# Patient Record
Sex: FEMALE | Race: WHITE | HISPANIC OR LATINO | Employment: UNEMPLOYED | URBAN - NONMETROPOLITAN AREA
[De-identification: names, ages, dates, MRNs, and addresses within clinical notes are randomized per-mention and may not be internally consistent; named-entity substitution may affect disease eponyms.]

---

## 2017-03-21 ENCOUNTER — OFFICE VISIT (OUTPATIENT)
Dept: URGENT CARE | Facility: PHYSICIAN GROUP | Age: 62
End: 2017-03-21
Payer: MEDICAID

## 2017-03-21 VITALS
WEIGHT: 145 LBS | TEMPERATURE: 97.9 F | HEART RATE: 76 BPM | OXYGEN SATURATION: 94 % | SYSTOLIC BLOOD PRESSURE: 150 MMHG | RESPIRATION RATE: 16 BRPM | DIASTOLIC BLOOD PRESSURE: 82 MMHG

## 2017-03-21 DIAGNOSIS — R05.9 COUGH: ICD-10-CM

## 2017-03-21 DIAGNOSIS — J06.9 UPPER RESPIRATORY TRACT INFECTION, UNSPECIFIED TYPE: ICD-10-CM

## 2017-03-21 PROCEDURE — 99213 OFFICE O/P EST LOW 20 MIN: CPT | Performed by: PHYSICIAN ASSISTANT

## 2017-03-21 RX ORDER — FLUTICASONE PROPIONATE 50 MCG
2 SPRAY, SUSPENSION (ML) NASAL DAILY
Qty: 1 BOTTLE | Refills: 1 | Status: ON HOLD | OUTPATIENT
Start: 2017-03-21 | End: 2018-02-10

## 2017-03-21 ASSESSMENT — ENCOUNTER SYMPTOMS
SORE THROAT: 1
SHORTNESS OF BREATH: 0
RHINORRHEA: 1
SPUTUM PRODUCTION: 0
COUGH: 1
WHEEZING: 0
CHILLS: 0
FEVER: 0
HEADACHES: 1

## 2017-03-21 NOTE — MR AVS SNAPSHOT
Lucila Hutchison   3/21/2017 9:05 AM   Office Visit   MRN: [id number]    Department:  [facility]   Dept Phone:  [phone]    Description:  Female : 1955   Provider:  MAGNUS Woodall           Reason for Visit     Sinusitis pt states sore throat, headache, sneezing, cough, ear pain x1week       Allergies as of 3/21/2017     No Known Allergies      You were diagnosed with     Upper respiratory tract infection, unspecified type   [0068023]       Cough   [786.2.ICD-9-CM]         Vital Signs     Blood Pressure Pulse Temperature Respirations Weight       150/82 mmHg 76 36.6 °C (97.9 °F) 16 65.772 kg (145 lb)     Oxygen Saturation Smoking Status                94% Never Smoker           Basic Information     Date Of Birth Sex Race Ethnicity Preferred Language    1955 Female Unable to Obtain  Origin (Romanian,Tajik,Stateless,Ugandan, etc) English      Health Maintenance        Date Due Completion Dates    IMM DTaP/Tdap/Td Vaccine (1 - Tdap) 1972 ---    PAP SMEAR 1974 ---    MAMMOGRAM 1993 ---    COLONOSCOPY 2003 ---    IMM ZOSTER VACCINE 2013 ---    IMM INFLUENZA (1) 2016 ---            Current Immunizations     No immunizations on file.      Below and/or attached are the medications your provider expects you to take. Review all of your home medications and newly ordered medications with your provider and/or pharmacist. Follow medication instructions as directed by your provider and/or pharmacist. Please keep your medication list with you and share with your provider. Update the information when medications are discontinued, doses are changed, or new medications (including over-the-counter products) are added; and carry medication information at all times in the event of emergency situations     Allergies:  No Known Allergies          Medications  Valid as of: 2017 -  9:29 AM    Generic Name Brand Name Tablet Size Instructions for use    Ascorbic Acid  (Tab) ascorbic acid 500 MG Take 500 mg by mouth every day.        Cyanocobalamin (Tab) VITAMIN B-12 100 MCG Take 100 mcg by mouth every day.        DM-Doxylamine-Acetaminophen   Take  by mouth.        Ergocalciferol (Cap) DRISDOL 47726 UNITS Take  by mouth every 7 days.        Fexofenadine HCl (Tab) ALLEGRA 180 MG Take 1 Tab by mouth every day.        Fluticasone Propionate (Suspension) FLONASE 50 MCG/ACT Spray 2 Sprays in nose every day.        Magnesium   Take  by mouth.        Multiple Vitamin   Take  by mouth.        Phenol   Spray  in mouth/throat.        Pseudoephedrine HCl   Take  by mouth.        .                 Medicines prescribed today were sent to:     [address] [address]    Phone: [phone] Fax: [phone]    Open 24 Hours?: No      Medication refill instructions:       If your prescription bottle indicates you have medication refills left, it is not necessary to call your provider’s office. Please contact your pharmacy and they will refill your medication.    If your prescription bottle indicates you do not have any refills left, you may request refills at any time through one of the following ways: The online [employer] system (except Urgent Care), by calling your provider’s office, or by asking your pharmacy to contact your provider’s office with a refill request. Medication refills are processed only during regular business hours and may not be available until the next business day. Your provider may request additional information or to have a follow-up visit with you prior to refilling your medication.   *Please Note: Medication refills are assigned a new Rx number when refilled electronically. Your pharmacy may indicate that no refills were authorized even though a new prescription for the same medication is available at the pharmacy. Please request the medicine by name with the pharmacy before contacting your provider  "for a refill.        Instructions        Use Tylenol and/or ibuprofen as needed for pain or fever.  Use a Brad Med, Neti Pot, or other nasal irrigation device daily.  Use Flonase daily.  May try a short course of a decongestant such as Sudafed.  May try Afrin nasal spray for 3-5 days and then discontinue use.  May try an over-the-counter antihistamine such as Zyrtec, Claritin, or Allegra.  Use a cool mist humidifier with distilled water.  Elevate the head of your bed a few inches.  Drink plenty of fluids and get adequate rest.  Follow up with primary care provider in a few days if not improving.  Return for new or worsening symptoms.    Use Tylenol y / o ibuprofeno según sea necesario para el dolor o la fiebre.  Use un Brad Med, Neti, u otro dispositivo de irrigación nasal diaria.  Flonase utilizar todos los días.  Puede probar un curso corto de un descongestionante shaila Sudafed.  Puede tratar Afrin aerosol nasal ramez 3-5 días y luego deje de usarlo.  Puede probar un antihistamínico de venta bony, shaila Zyrtec, Claritin, o Allegra.  Use un humidificador de vapor frío con agua destilada.  Elevar la cabecera de la cama unas cuantas pulgadas.  Beber mucho líquido y descansar lo suficiente.  Seguimiento con el proveedor de atención primaria en pocos días si no se mejora.  Volver a los síntomas nuevos o que empeoran.      Upper Respiratory Infection, Adult  Most upper respiratory infections (URIs) are caused by a virus. A URI affects the nose, throat, and upper air passages. The most common type of URI is often called \"the common cold.\"  HOME CARE   · Take medicines only as told by your doctor.  · Gargle warm saltwater or take cough drops to comfort your throat as told by your doctor.  · Use a warm mist humidifier or inhale steam from a shower to increase air moisture. This may make it easier to breathe.  · Drink enough fluid to keep your pee (urine) clear or pale yellow.  · Eat soups and other clear broths.  · Have a " healthy diet.  · Rest as needed.  · Go back to work when your fever is gone or your doctor says it is okay.  ¨ You may need to stay home longer to avoid giving your URI to others.  ¨ You can also wear a face mask and wash your hands often to prevent spread of the virus.  · Use your inhaler more if you have asthma.  · Do not use any tobacco products, including cigarettes, chewing tobacco, or electronic cigarettes. If you need help quitting, ask your doctor.  GET HELP IF:  · You are getting worse, not better.  · Your symptoms are not helped by medicine.  · You have chills.  · You are getting more short of breath.  · You have brown or red mucus.  · You have yellow or brown discharge from your nose.  · You have pain in your face, especially when you bend forward.  · You have a fever.  · You have puffy (swollen) neck glands.  · You have pain while swallowing.  · You have white areas in the back of your throat.  GET HELP RIGHT AWAY IF:   · You have very bad or constant:  ¨ Headache.  ¨ Ear pain.  ¨ Pain in your forehead, behind your eyes, and over your cheekbones (sinus pain).  ¨ Chest pain.  · You have long-lasting (chronic) lung disease and any of the following:  ¨ Wheezing.  ¨ Long-lasting cough.  ¨ Coughing up blood.  ¨ A change in your usual mucus.  · You have a stiff neck.  · You have changes in your:  ¨ Vision.  ¨ Hearing.  ¨ Thinking.  ¨ Mood.  MAKE SURE YOU:   · Understand these instructions.  · Will watch your condition.  · Will get help right away if you are not doing well or get worse.     This information is not intended to replace advice given to you by your health care provider. Make sure you discuss any questions you have with your health care provider.     Document Released: 06/05/2009 Document Revised: 05/03/2016 Document Reviewed: 03/25/2015  [employer] Interactive Patient Education ©2016 [employer]      Infección del tracto respiratorio superior, adultos  (Upper Respiratory Infection, Adult)  La  mayoría de las infecciones del tracto respiratorio superior están causadas por un virus. Un infección del tracto respiratorio superior afecta la nariz, la garganta y las vías respiratorias superiores. El tipo más común de infección del tracto respiratorio superior es el resfrío común.  CUIDADOS EN EL HOGAR   · [place] los medicamentos solamente shaila se lo haya indicado el médico.  · A fin de aliviar el dolor de garganta, rubén gárgaras con solución salina templada o consuma caramelos para la tos, shaila se lo haya indicado el médico.  · Use un humidificador de vapor cálido o inhale el vapor de la ducha para aumentar la humedad del aire. [place] facilitará la respiración.  · Christy suficiente líquido para mantener el pis (orina) tamia o de color amarillo pálido.  · [place] sopas y caldos transparentes.  · Siga kevin dieta saludable.  · Descanse todo lo que sea necesario.  · Regrese al trabajo cuando la fiebre haya desaparecido o el médico le diga que puede hacerlo.  ¨ Es posible que deba quedarse en reed casa ramez un tiempo prolongado, para no transmitir la infección a los demás.  ¨ También puede usar un barbijo y lavarse las urbano con frecuencia para evitar el contagio del virus.  · Si tiene asma, use el inhalador con mayor frecuencia.  · No consuma ningún producto que contenga tabaco, lo que incluye cigarrillos, tabaco de mascar o cigarrillos electrónicos. Si necesita ayuda para dejar de fumar, consulte al médico.  SOLICITE AYUDA SI:  · Siente que empeora o que no mejora.  · Los medicamentos no logran aliviar los síntomas.  · Tiene escalofríos.  · La dificultad para respirar es peor.  · Tiene mucosidad marrón o vinicio.  · Tiene kevin secreción amarilla o marrón de la nariz.  · Le duele la lor, especialmente al inclinarse hacia adelante.  · Tiene fiebre.  · Tiene los ganglios del bill hinchados.  · Siente dolor al tragar.  · Tiene zonas leslie en la parte de atrás de la garganta.  SOLICITE AYUDA DE INMEDIATO SI:   · Los siguientes  síntomas son muy intensos o constantes:  ¨ Dolor de calin.  ¨ Dolor de oídos.  ¨ Dolor en la frente, detrás de los ojos y por encima de los pómulos (dolor sinusal).  ¨ Dolor en el pecho.  · Tiene enfermedad pulmonar prolongada (crónica) y cualquiera de estos síntomas:  ¨ Sibilancias.  ¨ Tos prolongada.  ¨ Tos con cj.  ¨ Cambio en la mucosidad habitual.  · Presenta rigidez en el bill.  · Tiene cambios en:  ¨ La visión.  ¨ La audición.  ¨ El pensamiento.  ¨ El estado de ánimo.  ASEGÚRESE DE QUE:   · Comprende estas instrucciones.  · Controlará reed afección.  · Recibirá ayuda de inmediato si no mejora o si empeora.     Esta información no tiene shaila fin reemplazar el consejo del médico. Asegúrese de hacerle al médico cualquier pregunta que tenga.     Document Released: 05/21/2012 Document Revised: 05/03/2016  [employer] Interactive Patient Education ©2016 [employer] Inc.      Cough, Adult   A cough is a reflex. It helps you clear your throat and airways. A cough can help heal your body. A cough can last 2 or 3 weeks (acute) or may last more than 8 weeks (chronic). Some common causes of a cough can include an infection, allergy, or a cold.  HOME CARE  · Only take medicine as told by your doctor.  · If given, take your medicines (antibiotics) as told. Finish them even if you start to feel better.  · Use a cold steam vaporizer or humidifier in your home. This can help loosen thick spit (secretions).  · Sleep so you are almost sitting up (semi-upright). Use pillows to do this. This helps reduce coughing.  · Rest as needed.  · Stop smoking if you smoke.  GET HELP RIGHT AWAY IF:  · You have yellowish-white fluid (pus) in your thick spit.  · Your cough gets worse.  · Your medicine does not reduce coughing, and you are losing sleep.  · You cough up blood.  · You have trouble breathing.  · Your pain gets worse and medicine does not help.  · You have a fever.  MAKE SURE YOU:   · Understand these instructions.  · Will watch  your condition.  · Will get help right away if you are not doing well or get worse.     This information is not intended to replace advice given to you by your health care provider. Make sure you discuss any questions you have with your health care provider.     Document Released: 08/30/2012 Document Revised: 01/08/2016 Document Reviewed: 02/24/2016  [employer] Interactive Patient Education ©2016 Elsevier Inc.      Tos - Adultos   (Cough, Adult)   La tos es un reflejo. Ayuda a limpiar la garganta y las vías aéreas. Ayuda a que el organismo se cure. La tos puede durar entre 2 ó 3 semanas (aguda) o puede durar más de 8 semanas (crónica) Las causas más frecuentes son las infecciones, las alergias o los resfríos.   CUIDADOS EN EL HOGAR   · Sólo tome la medicación según las indicaciones.  · Si le indican medicamentos (antibióticos), tómelos toby shaila se le indicó. Tómelos todos, aunque se sienta mejor.  · Coloque un vaporizador o humidificador de angie fría en reed casa. [place] puede ayudar a aflojar el moco espeso (secreciones).  · Duerma de modo que quede beba sentado (semi erguido). Use almohadas para lograr esta posición. [place] ayuda a disminuir la tos.  · Descanse todo lo que pueda.  · Si fuma, abandone el hábito.  SOLICITE AYUDA DE INMEDIATO SI:   · Observa kevin secreción de color connolly amarillento (pus) en el moco.  · La tos empeora.  · El medicamento no disminuye la tos y no puede dormir.  · Tose y escupe cj.  · Tiene dificultad para respirar.  · Siente un dolor más intenso y los medicamentos no hacen efecto.  · Tiene fiebre.  ASEGÚRESE DE QUE:   · Comprende estas instrucciones.  · Controlará reed enfermedad.  · Solicitará ayuda de inmediato si no mejora o si empeora.     Esta información no tiene shaila fin reemplazar el consejo del médico. Asegúrese de hacerle al médico cualquier pregunta que tenga.     Document Released: 08/30/2012 Document Revised: 03/11/2013  Elsevier Interactive Patient Education ©2016 Elsevier  Inc.            [employer] Access Code: [email or web address]  Expires: 4/20/2017  9:29 AM    Your email address is not on file at [facility].  Email Addresses are required for you to sign up for [employer], please contact [phone] to verify your personal information and to provide your email address prior to attempting to register for [employer].    Lucila Hutchison  [address] [address]    [employer]  A secure, online tool to manage your health information     [facility]’s [employer]® is a secure, online tool that connects you to your personalized health information from the privacy of your home -- day or night - making it very easy for you to manage your healthcare. Once the activation process is completed, you can even access your medical information using the [employer] bossman, which is available for free in the Apple Bossman store or Google Play store.     To learn more about [employer], visit [email or web address]    There are two levels of access available (as shown below):   My Chart Features  [facility] Primary Care Doctor [facility]  Specialists [facility]  Urgent  Care Non-[facility] Primary Care Doctor   Email your healthcare team securely and privately 24/7 X X X    Manage appointments: schedule your next appointment; view details of past/upcoming appointments X      Request prescription refills. X      View recent personal medical records, including lab and immunizations X X X X   View health record, including health history, allergies, medications X X X X   Read reports about your outpatient visits, procedures, consult and ER notes X X X X   See your discharge summary, which is a recap of your hospital and/or ER visit that includes your diagnosis, lab results, and care plan X X  X     How to register for [employer]:  Once your e-mail address has been verified, follow the following steps to sign up for [employer].     1. Go to  [email or web address]  2. Click on the Sign Up Now box, which takes you to the New  Member Sign Up page. You will need to provide the following information:  a. Enter your [employer] Access Code exactly as it appears at the top of this page. (You will not need to use this code after you’ve completed the sign-up process. If you do not sign up before the expiration date, you must request a new code.)   b. Enter your date of birth.   c. Enter your home email address.   d. Click Submit, and follow the next screen’s instructions.  3. Create a [employer] ID. This will be your [employer] login ID and cannot be changed, so think of one that is secure and easy to remember.  4. Create a [employer] password. You can change your password at any time.  5. Enter your Password Reset Question and Answer. This can be used at a later time if you forget your password.   6. Enter your e-mail address. This allows you to receive e-mail notifications when new information is available in [employer].  7. Click Sign Up. You can now view your health information.    For assistance activating your [employer] account, call [phone]

## 2017-03-21 NOTE — PATIENT INSTRUCTIONS
"    Use Tylenol and/or ibuprofen as needed for pain or fever.  Use a Brad Med, Neti Pot, or other nasal irrigation device daily.  Use Flonase daily.  May try a short course of a decongestant such as Sudafed.  May try Afrin nasal spray for 3-5 days and then discontinue use.  May try an over-the-counter antihistamine such as Zyrtec, Claritin, or Allegra.  Use a cool mist humidifier with distilled water.  Elevate the head of your bed a few inches.  Drink plenty of fluids and get adequate rest.  Follow up with primary care provider in a few days if not improving.  Return for new or worsening symptoms.    Use Tylenol y / o ibuprofeno según sea necesario para el dolor o la fiebre.  Use un Brad Med, Neti, u otro dispositivo de irrigación nasal diaria.  Flonase utilizar todos los días.  Puede probar un curso corto de un descongestionante shaila Sudafed.  Puede tratar Afrin aerosol nasal ramez 3-5 días y luego deje de usarlo.  Puede probar un antihistamínico de venta bony, shaila Zyrtec, Claritin, o Allegra.  Use un humidificador de vapor frío con agua destilada.  Elevar la cabecera de la cama unas cuantas pulgadas.  Beber mucho líquido y descansar lo suficiente.  Seguimiento con el proveedor de atención primaria en pocos días si no se mejora.  Volver a los síntomas nuevos o que empeoran.      Upper Respiratory Infection, Adult  Most upper respiratory infections (URIs) are caused by a virus. A URI affects the nose, throat, and upper air passages. The most common type of URI is often called \"the common cold.\"  HOME CARE   · Take medicines only as told by your doctor.  · Gargle warm saltwater or take cough drops to comfort your throat as told by your doctor.  · Use a warm mist humidifier or inhale steam from a shower to increase air moisture. This may make it easier to breathe.  · Drink enough fluid to keep your pee (urine) clear or pale yellow.  · Eat soups and other clear broths.  · Have a healthy diet.  · Rest as " needed.  · Go back to work when your fever is gone or your doctor says it is okay.  ¨ You may need to stay home longer to avoid giving your URI to others.  ¨ You can also wear a face mask and wash your hands often to prevent spread of the virus.  · Use your inhaler more if you have asthma.  · Do not use any tobacco products, including cigarettes, chewing tobacco, or electronic cigarettes. If you need help quitting, ask your doctor.  GET HELP IF:  · You are getting worse, not better.  · Your symptoms are not helped by medicine.  · You have chills.  · You are getting more short of breath.  · You have brown or red mucus.  · You have yellow or brown discharge from your nose.  · You have pain in your face, especially when you bend forward.  · You have a fever.  · You have puffy (swollen) neck glands.  · You have pain while swallowing.  · You have white areas in the back of your throat.  GET HELP RIGHT AWAY IF:   · You have very bad or constant:  ¨ Headache.  ¨ Ear pain.  ¨ Pain in your forehead, behind your eyes, and over your cheekbones (sinus pain).  ¨ Chest pain.  · You have long-lasting (chronic) lung disease and any of the following:  ¨ Wheezing.  ¨ Long-lasting cough.  ¨ Coughing up blood.  ¨ A change in your usual mucus.  · You have a stiff neck.  · You have changes in your:  ¨ Vision.  ¨ Hearing.  ¨ Thinking.  ¨ Mood.  MAKE SURE YOU:   · Understand these instructions.  · Will watch your condition.  · Will get help right away if you are not doing well or get worse.     This information is not intended to replace advice given to you by your health care provider. Make sure you discuss any questions you have with your health care provider.     Document Released: 06/05/2009 Document Revised: 05/03/2016 Document Reviewed: 03/25/2015  [employer] Interactive Patient Education ©2016 [employer]      Infección del tracto respiratorio superior, adultos  (Upper Respiratory Infection, Adult)  La mayoría de las infecciones del  tracto respiratorio superior están causadas por un virus. Un infección del tracto respiratorio superior afecta la nariz, la garganta y las vías respiratorias superiores. El tipo más común de infección del tracto respiratorio superior es el resfrío común.  CUIDADOS EN EL HOGAR   · [place] los medicamentos solamente shaila se lo haya indicado el médico.  · A fin de aliviar el dolor de garganta, rubén gárgaras con solución salina templada o consuma caramelos para la tos, shaila se lo haya indicado el médico.  · Use un humidificador de vapor cálido o inhale el vapor de la ducha para aumentar la humedad del aire. [place] facilitará la respiración.  · Christy suficiente líquido para mantener el pis (orina) tamia o de color amarillo pálido.  · [place] sopas y caldos transparentes.  · Siga kevin dieta saludable.  · Descanse todo lo que sea necesario.  · Regrese al trabajo cuando la fiebre haya desaparecido o el médico le diga que puede hacerlo.  ¨ Es posible que deba quedarse en reed casa ramez un tiempo prolongado, para no transmitir la infección a los demás.  ¨ También puede usar un barbijo y lavarse las urbano con frecuencia para evitar el contagio del virus.  · Si tiene asma, use el inhalador con mayor frecuencia.  · No consuma ningún producto que contenga tabaco, lo que incluye cigarrillos, tabaco de mascar o cigarrillos electrónicos. Si necesita ayuda para dejar de fumar, consulte al médico.  SOLICITE AYUDA SI:  · Siente que empeora o que no mejora.  · Los medicamentos no logran aliviar los síntomas.  · Tiene escalofríos.  · La dificultad para respirar es peor.  · Tiene mucosidad marrón o vinicio.  · Tiene kevin secreción amarilla o marrón de la nariz.  · Le duele la lor, especialmente al inclinarse hacia adelante.  · Tiene fiebre.  · Tiene los ganglios del bill hinchados.  · Siente dolor al tragar.  · Tiene zonas leslie en la parte de atrás de la garganta.  SOLICITE AYUDA DE INMEDIATO SI:   · Los siguientes síntomas son muy intensos o  constantes:  ¨ Dolor de calin.  ¨ Dolor de oídos.  ¨ Dolor en la frente, detrás de los ojos y por encima de los pómulos (dolor sinusal).  ¨ Dolor en el pecho.  · Tiene enfermedad pulmonar prolongada (crónica) y cualquiera de estos síntomas:  ¨ Sibilancias.  ¨ Tos prolongada.  ¨ Tos con cj.  ¨ Cambio en la mucosidad habitual.  · Presenta rigidez en el bill.  · Tiene cambios en:  ¨ La visión.  ¨ La audición.  ¨ El pensamiento.  ¨ El estado de ánimo.  ASEGÚRESE DE QUE:   · Comprende estas instrucciones.  · Controlará reed afección.  · Recibirá ayuda de inmediato si no mejora o si empeora.     Esta información no tiene shaila fin reemplazar el consejo del médico. Asegúrese de hacerle al médico cualquier pregunta que tenga.     Document Released: 05/21/2012 Document Revised: 05/03/2016  [employer] Interactive Patient Education ©2016 [employer]      Cough, Adult   A cough is a reflex. It helps you clear your throat and airways. A cough can help heal your body. A cough can last 2 or 3 weeks (acute) or may last more than 8 weeks (chronic). Some common causes of a cough can include an infection, allergy, or a cold.  HOME CARE  · Only take medicine as told by your doctor.  · If given, take your medicines (antibiotics) as told. Finish them even if you start to feel better.  · Use a cold steam vaporizer or humidifier in your home. This can help loosen thick spit (secretions).  · Sleep so you are almost sitting up (semi-upright). Use pillows to do this. This helps reduce coughing.  · Rest as needed.  · Stop smoking if you smoke.  GET HELP RIGHT AWAY IF:  · You have yellowish-white fluid (pus) in your thick spit.  · Your cough gets worse.  · Your medicine does not reduce coughing, and you are losing sleep.  · You cough up blood.  · You have trouble breathing.  · Your pain gets worse and medicine does not help.  · You have a fever.  MAKE SURE YOU:   · Understand these instructions.  · Will watch your condition.  · Will get  help right away if you are not doing well or get worse.     This information is not intended to replace advice given to you by your health care provider. Make sure you discuss any questions you have with your health care provider.     Document Released: 08/30/2012 Document Revised: 01/08/2016 Document Reviewed: 02/24/2016  [employer] Interactive Patient Education ©2016 [employer]      Tos - Adultos   (Cough, Adult)   La tos es un reflejo. Ayuda a limpiar la garganta y las vías aéreas. Ayuda a que el organismo se cure. La tos puede durar entre 2 ó 3 semanas (aguda) o puede durar más de 8 semanas (crónica) Las causas más frecuentes son las infecciones, las alergias o los resfríos.   CUIDADOS EN EL HOGAR   · Sólo tome la medicación según las indicaciones.  · Si le indican medicamentos (antibióticos), tómelos toby shaila se le indicó. Tómelos todos, aunque se sienta mejor.  · Coloque un vaporizador o humidificador de angie fría en reed casa. [place] puede ayudar a aflojar el moco espeso (secreciones).  · Duerma de modo que quede beba sentado (semi erguido). Use almohadas para lograr esta posición. [place] ayuda a disminuir la tos.  · Descanse todo lo que pueda.  · Si fuma, abandone el hábito.  SOLICITE AYUDA DE INMEDIATO SI:   · Observa kevin secreción de color connolly amarillento (pus) en el moco.  · La tos empeora.  · El medicamento no disminuye la tos y no puede dormir.  · Tose y escupe cj.  · Tiene dificultad para respirar.  · Siente un dolor más intenso y los medicamentos no hacen efecto.  · Tiene fiebre.  ASEGÚRESE DE QUE:   · Comprende estas instrucciones.  · Controlará reed enfermedad.  · Solicitará ayuda de inmediato si no mejora o si empeora.     Esta información no tiene shaila fin reemplazar el consejo del médico. Asegúrese de hacerle al médico cualquier pregunta que tenga.     Document Released: 08/30/2012 Document Revised: 03/11/2013  [employer] Interactive Patient Education ©2016 [employer].

## 2017-03-21 NOTE — PROGRESS NOTES
Subjective:      Lucila Hutchison is a 61 y.o. female who presents with Sinusitis            HPI Comments: Almost 1 week history of rhinorrhea, congestion, sneezing, cough, headache, and generalized not feeling well. No shortness of breath or wheezing. She reports fever initially, but no fever for the last several days.    URI   This is a new problem. The current episode started in the past 7 days. The problem has been waxing and waning. There has been no fever. Associated symptoms include congestion, coughing, headaches, rhinorrhea and a sore throat. Pertinent negatives include no ear pain or wheezing. Treatments tried: OTC multi symptom cold relief. The treatment provided mild relief.       Review of Systems   Constitutional: Negative for fever and chills.   HENT: Positive for congestion, rhinorrhea and sore throat. Negative for ear discharge and ear pain.    Respiratory: Positive for cough. Negative for sputum production, shortness of breath and wheezing.    Neurological: Positive for headaches.     Allergies:Review of patient's allergies indicates no known allergies.    Current Outpatient Prescriptions Ordered in [facility]   Medication Sig Dispense Refill   • DM-Doxylamine-Acetaminophen (NYQUIL COLD & FLU PO) Take  by mouth.     • Pseudoephedrine HCl (SUDAFED PO) Take  by mouth.     • Phenol (CHLORASEPTIC MOUTH PAIN MT) [place]  in mouth/throat.     • fluticasone (FLONASE) 50 MCG/ACT nasal spray Spray 2 Sprays in nose every day. 1 Bottle 1   • ascorbic acid (ASCORBIC ACID) 500 MG Tab Take 500 mg by mouth every day.     • cyanocobalamin (VITAMIN B-12) 100 MCG Tab Take 100 mcg by mouth every day.     • MAGNESIUM PO Take  by mouth.     • Multiple Vitamin (MULTI-VITAMIN DAILY PO) Take  by mouth.     • vitamin D, Ergocalciferol, (DRISDOL) 21126 UNITS Cap capsule Take  by mouth every 7 days.     • fexofenadine (ALLEGRA) 180 MG tablet Take 1 Tab by mouth every day. 30 Tab 0     No current Epic-ordered facility-administered  medications on file.       History reviewed. No pertinent past medical history.    Social History   Substance Use Topics   • Smoking status: Never Smoker    • Smokeless tobacco: Never Used   • Alcohol Use: No       No family status information on file.   History reviewed. No pertinent family history.       Objective:     /82 mmHg  Pulse 76  Temp(Src) 36.6 °C (97.9 °F)  Resp 16  Ht   Wt 65.772 kg (145 lb)  SpO2 94%     Physical Exam   Constitutional: She is oriented to person, place, and time. She appears well-developed and well-nourished. No distress.   HENT:   Head: Normocephalic and atraumatic.   Right Ear: External ear normal.   Left Ear: External ear normal.   Mouth/Throat: Oropharynx is clear and moist.   Canals unremarkable. Tympanic membranes dull without erythema. Very mild nasal mucosal edema. No sinus tenderness   Eyes: Right eye exhibits no discharge. Left eye exhibits no discharge.   Neck: Normal range of motion. Neck supple.   Cardiovascular: Normal rate and regular rhythm.    Pulmonary/Chest: Effort normal and breath sounds normal. She has no wheezes. She has no rales.   Neurological: She is alert and oriented to person, place, and time.   Skin: Skin is warm and dry. She is not diaphoretic.   Psychiatric: She has a normal mood and affect. Her behavior is normal. Judgment and thought content normal.   Nursing note and vitals reviewed.              Assessment/Plan:     1. Upper respiratory tract infection, unspecified type  fluticasone (FLONASE) 50 MCG/ACT nasal spray    Fluctuating for about a week. No obvious bacterial etiology. Likely viral and/or allergic. Given written instructions. Follow-up with PCP   2. Cough      Fluctuating for couple of days. Likely secondary to postnasal drainage. Continue OTC cough suppressant       [employer] Interactive Patient Education given: URI, cough    Use Tylenol and/or ibuprofen as needed for pain or fever.  Use a Brad Med, Neti Pot, or other nasal  irrigation device daily.  Use Flonase daily.  May try a short course of a decongestant such as Sudafed.  May try Afrin nasal spray for 3-5 days and then discontinue use.  May try an over-the-counter antihistamine such as Zyrtec, Claritin, or Allegra.  Use a cool mist humidifier with distilled water.  Elevate the head of your bed a few inches.  Drink plenty of fluids and get adequate rest.  Follow up with primary care provider in a few days if not improving.  Return for new or worsening symptoms.      Please note that this dictation was created using voice recognition software. I have made every reasonable attempt to correct obvious errors, but I expect that there are errors of grammar and possibly content that I did not discover before finalizing the note.

## 2018-02-09 ENCOUNTER — HOSPITAL ENCOUNTER (OUTPATIENT)
Facility: MEDICAL CENTER | Age: 63
End: 2018-02-09

## 2018-02-09 ENCOUNTER — RESOLUTE PROFESSIONAL BILLING HOSPITAL PROF FEE (OUTPATIENT)
Dept: HOSPITALIST | Facility: MEDICAL CENTER | Age: 63
End: 2018-02-09
Payer: COMMERCIAL

## 2018-02-09 ENCOUNTER — HOSPITAL ENCOUNTER (OUTPATIENT)
Facility: MEDICAL CENTER | Age: 63
End: 2018-02-10
Attending: HOSPITALIST | Admitting: HOSPITALIST
Payer: COMMERCIAL

## 2018-02-09 PROCEDURE — 99220 PR INITIAL OBSERVATION CARE,LEVL III: CPT | Performed by: HOSPITALIST

## 2018-02-09 PROCEDURE — G0378 HOSPITAL OBSERVATION PER HR: HCPCS

## 2018-02-09 PROCEDURE — 700105 HCHG RX REV CODE 258: Performed by: HOSPITALIST

## 2018-02-09 PROCEDURE — 700102 HCHG RX REV CODE 250 W/ 637 OVERRIDE(OP): Performed by: HOSPITALIST

## 2018-02-09 PROCEDURE — A9270 NON-COVERED ITEM OR SERVICE: HCPCS | Performed by: HOSPITALIST

## 2018-02-09 RX ORDER — ACETAMINOPHEN 325 MG/1
650 TABLET ORAL EVERY 6 HOURS PRN
Status: DISCONTINUED | OUTPATIENT
Start: 2018-02-09 | End: 2018-02-10 | Stop reason: HOSPADM

## 2018-02-09 RX ORDER — CLONIDINE HYDROCHLORIDE 0.1 MG/1
0.1 TABLET ORAL EVERY 6 HOURS PRN
Status: DISCONTINUED | OUTPATIENT
Start: 2018-02-09 | End: 2018-02-10 | Stop reason: HOSPADM

## 2018-02-09 RX ORDER — ASPIRIN 81 MG/1
324 TABLET, CHEWABLE ORAL DAILY
Status: DISCONTINUED | OUTPATIENT
Start: 2018-02-10 | End: 2018-02-10 | Stop reason: HOSPADM

## 2018-02-09 RX ORDER — OXYCODONE HYDROCHLORIDE 5 MG/1
5 TABLET ORAL
Status: DISCONTINUED | OUTPATIENT
Start: 2018-02-09 | End: 2018-02-10 | Stop reason: HOSPADM

## 2018-02-09 RX ORDER — ZOLPIDEM TARTRATE 5 MG/1
5 TABLET ORAL
Status: DISCONTINUED | OUTPATIENT
Start: 2018-02-09 | End: 2018-02-10 | Stop reason: HOSPADM

## 2018-02-09 RX ORDER — MORPHINE SULFATE 4 MG/ML
2 INJECTION, SOLUTION INTRAMUSCULAR; INTRAVENOUS
Status: DISCONTINUED | OUTPATIENT
Start: 2018-02-09 | End: 2018-02-10 | Stop reason: HOSPADM

## 2018-02-09 RX ORDER — ENALAPRILAT 1.25 MG/ML
1.25 INJECTION INTRAVENOUS EVERY 6 HOURS PRN
Status: DISCONTINUED | OUTPATIENT
Start: 2018-02-09 | End: 2018-02-10 | Stop reason: HOSPADM

## 2018-02-09 RX ORDER — ASPIRIN 325 MG
325 TABLET ORAL DAILY
Status: DISCONTINUED | OUTPATIENT
Start: 2018-02-10 | End: 2018-02-10 | Stop reason: HOSPADM

## 2018-02-09 RX ORDER — SODIUM CHLORIDE 9 MG/ML
INJECTION, SOLUTION INTRAVENOUS CONTINUOUS
Status: DISCONTINUED | OUTPATIENT
Start: 2018-02-09 | End: 2018-02-10 | Stop reason: HOSPADM

## 2018-02-09 RX ORDER — PROMETHAZINE HYDROCHLORIDE 25 MG/1
12.5-25 SUPPOSITORY RECTAL EVERY 4 HOURS PRN
Status: DISCONTINUED | OUTPATIENT
Start: 2018-02-09 | End: 2018-02-10 | Stop reason: HOSPADM

## 2018-02-09 RX ORDER — ONDANSETRON 4 MG/1
4 TABLET, ORALLY DISINTEGRATING ORAL EVERY 4 HOURS PRN
Status: DISCONTINUED | OUTPATIENT
Start: 2018-02-09 | End: 2018-02-10 | Stop reason: HOSPADM

## 2018-02-09 RX ORDER — ASPIRIN 300 MG/1
300 SUPPOSITORY RECTAL DAILY
Status: DISCONTINUED | OUTPATIENT
Start: 2018-02-10 | End: 2018-02-10 | Stop reason: HOSPADM

## 2018-02-09 RX ORDER — BISACODYL 10 MG
10 SUPPOSITORY, RECTAL RECTAL
Status: DISCONTINUED | OUTPATIENT
Start: 2018-02-09 | End: 2018-02-10 | Stop reason: HOSPADM

## 2018-02-09 RX ORDER — POLYETHYLENE GLYCOL 3350 17 G/17G
1 POWDER, FOR SOLUTION ORAL
Status: DISCONTINUED | OUTPATIENT
Start: 2018-02-09 | End: 2018-02-10 | Stop reason: HOSPADM

## 2018-02-09 RX ORDER — PROMETHAZINE HYDROCHLORIDE 25 MG/1
12.5-25 TABLET ORAL EVERY 4 HOURS PRN
Status: DISCONTINUED | OUTPATIENT
Start: 2018-02-09 | End: 2018-02-10 | Stop reason: HOSPADM

## 2018-02-09 RX ORDER — AMOXICILLIN 250 MG
2 CAPSULE ORAL 2 TIMES DAILY
Status: DISCONTINUED | OUTPATIENT
Start: 2018-02-09 | End: 2018-02-10 | Stop reason: HOSPADM

## 2018-02-09 RX ORDER — OXYCODONE HYDROCHLORIDE 5 MG/1
2.5 TABLET ORAL
Status: DISCONTINUED | OUTPATIENT
Start: 2018-02-09 | End: 2018-02-10 | Stop reason: HOSPADM

## 2018-02-09 RX ORDER — ONDANSETRON 2 MG/ML
4 INJECTION INTRAMUSCULAR; INTRAVENOUS EVERY 4 HOURS PRN
Status: DISCONTINUED | OUTPATIENT
Start: 2018-02-09 | End: 2018-02-10 | Stop reason: HOSPADM

## 2018-02-09 RX ADMIN — OXYCODONE HYDROCHLORIDE 5 MG: 5 TABLET ORAL at 22:05

## 2018-02-09 RX ADMIN — SODIUM CHLORIDE: 9 INJECTION, SOLUTION INTRAVENOUS at 23:44

## 2018-02-09 RX ADMIN — STANDARDIZED SENNA CONCENTRATE AND DOCUSATE SODIUM 2 TABLET: 8.6; 5 TABLET, FILM COATED ORAL at 22:07

## 2018-02-09 ASSESSMENT — PATIENT HEALTH QUESTIONNAIRE - PHQ9
SUM OF ALL RESPONSES TO PHQ QUESTIONS 1-9: 0
SUM OF ALL RESPONSES TO PHQ9 QUESTIONS 1 AND 2: 0
1. LITTLE INTEREST OR PLEASURE IN DOING THINGS: NOT AT ALL
2. FEELING DOWN, DEPRESSED, IRRITABLE, OR HOPELESS: NOT AT ALL

## 2018-02-09 ASSESSMENT — PAIN SCALES - GENERAL
PAINLEVEL_OUTOF10: 10
PAINLEVEL_OUTOF10: 10

## 2018-02-09 ASSESSMENT — LIFESTYLE VARIABLES
EVER_SMOKED: NEVER
ALCOHOL_USE: NO

## 2018-02-10 ENCOUNTER — APPOINTMENT (OUTPATIENT)
Dept: RADIOLOGY | Facility: MEDICAL CENTER | Age: 63
End: 2018-02-10
Attending: HOSPITALIST
Payer: COMMERCIAL

## 2018-02-10 ENCOUNTER — PATIENT OUTREACH (OUTPATIENT)
Dept: HEALTH INFORMATION MANAGEMENT | Facility: OTHER | Age: 63
End: 2018-02-10

## 2018-02-10 ENCOUNTER — APPOINTMENT (OUTPATIENT)
Dept: RADIOLOGY | Facility: MEDICAL CENTER | Age: 63
End: 2018-02-10
Attending: INTERNAL MEDICINE
Payer: COMMERCIAL

## 2018-02-10 VITALS
TEMPERATURE: 98.3 F | DIASTOLIC BLOOD PRESSURE: 86 MMHG | WEIGHT: 154.98 LBS | SYSTOLIC BLOOD PRESSURE: 156 MMHG | HEIGHT: 62 IN | BODY MASS INDEX: 28.52 KG/M2 | HEART RATE: 90 BPM | OXYGEN SATURATION: 95 % | RESPIRATION RATE: 17 BRPM

## 2018-02-10 PROBLEM — M19.90 OSTEOARTHRITIS: Status: ACTIVE | Noted: 2018-02-10

## 2018-02-10 PROBLEM — I10 ESSENTIAL HYPERTENSION: Status: ACTIVE | Noted: 2018-02-10

## 2018-02-10 PROBLEM — A80.9 POLIO: Status: ACTIVE | Noted: 2018-02-10

## 2018-02-10 PROBLEM — R07.9 CHEST PAIN: Status: ACTIVE | Noted: 2018-02-10

## 2018-02-10 LAB
ANION GAP SERPL CALC-SCNC: 8 MMOL/L (ref 0–11.9)
BASOPHILS # BLD AUTO: 0.6 % (ref 0–1.8)
BASOPHILS # BLD: 0.04 K/UL (ref 0–0.12)
BUN SERPL-MCNC: 10 MG/DL (ref 8–22)
CALCIUM SERPL-MCNC: 9.2 MG/DL (ref 8.5–10.5)
CHLORIDE SERPL-SCNC: 109 MMOL/L (ref 96–112)
CO2 SERPL-SCNC: 22 MMOL/L (ref 20–33)
CREAT SERPL-MCNC: 0.36 MG/DL (ref 0.5–1.4)
EOSINOPHIL # BLD AUTO: 0.13 K/UL (ref 0–0.51)
EOSINOPHIL NFR BLD: 1.8 % (ref 0–6.9)
ERYTHROCYTE [DISTWIDTH] IN BLOOD BY AUTOMATED COUNT: 47.2 FL (ref 35.9–50)
EST. AVERAGE GLUCOSE BLD GHB EST-MCNC: 120 MG/DL
GLUCOSE SERPL-MCNC: 93 MG/DL (ref 65–99)
HBA1C MFR BLD: 5.8 % (ref 0–5.6)
HCT VFR BLD AUTO: 39.5 % (ref 37–47)
HGB BLD-MCNC: 13.2 G/DL (ref 12–16)
IMM GRANULOCYTES # BLD AUTO: 0.02 K/UL (ref 0–0.11)
IMM GRANULOCYTES NFR BLD AUTO: 0.3 % (ref 0–0.9)
LYMPHOCYTES # BLD AUTO: 2.67 K/UL (ref 1–4.8)
LYMPHOCYTES NFR BLD: 38 % (ref 22–41)
MCH RBC QN AUTO: 32 PG (ref 27–33)
MCHC RBC AUTO-ENTMCNC: 33.4 G/DL (ref 33.6–35)
MCV RBC AUTO: 95.9 FL (ref 81.4–97.8)
MONOCYTES # BLD AUTO: 0.57 K/UL (ref 0–0.85)
MONOCYTES NFR BLD AUTO: 8.1 % (ref 0–13.4)
NEUTROPHILS # BLD AUTO: 3.6 K/UL (ref 2–7.15)
NEUTROPHILS NFR BLD: 51.2 % (ref 44–72)
NRBC # BLD AUTO: 0 K/UL
NRBC BLD-RTO: 0 /100 WBC
PLATELET # BLD AUTO: 200 K/UL (ref 164–446)
PMV BLD AUTO: 10.7 FL (ref 9–12.9)
POTASSIUM SERPL-SCNC: 3.6 MMOL/L (ref 3.6–5.5)
RBC # BLD AUTO: 4.12 M/UL (ref 4.2–5.4)
SODIUM SERPL-SCNC: 139 MMOL/L (ref 135–145)
TROPONIN I SERPL-MCNC: <0.01 NG/ML (ref 0–0.04)
TROPONIN I SERPL-MCNC: <0.01 NG/ML (ref 0–0.04)
WBC # BLD AUTO: 7 K/UL (ref 4.8–10.8)

## 2018-02-10 PROCEDURE — 85025 COMPLETE CBC W/AUTO DIFF WBC: CPT

## 2018-02-10 PROCEDURE — 700111 HCHG RX REV CODE 636 W/ 250 OVERRIDE (IP)

## 2018-02-10 PROCEDURE — 84484 ASSAY OF TROPONIN QUANT: CPT | Mod: 91

## 2018-02-10 PROCEDURE — 700111 HCHG RX REV CODE 636 W/ 250 OVERRIDE (IP): Performed by: HOSPITALIST

## 2018-02-10 PROCEDURE — 94760 N-INVAS EAR/PLS OXIMETRY 1: CPT

## 2018-02-10 PROCEDURE — G0378 HOSPITAL OBSERVATION PER HR: HCPCS

## 2018-02-10 PROCEDURE — A9502 TC99M TETROFOSMIN: HCPCS

## 2018-02-10 PROCEDURE — A9270 NON-COVERED ITEM OR SERVICE: HCPCS | Performed by: HOSPITALIST

## 2018-02-10 PROCEDURE — 83036 HEMOGLOBIN GLYCOSYLATED A1C: CPT

## 2018-02-10 PROCEDURE — 36415 COLL VENOUS BLD VENIPUNCTURE: CPT

## 2018-02-10 PROCEDURE — 80048 BASIC METABOLIC PNL TOTAL CA: CPT

## 2018-02-10 PROCEDURE — 99217 PR OBSERVATION CARE DISCHARGE: CPT | Performed by: INTERNAL MEDICINE

## 2018-02-10 PROCEDURE — 700102 HCHG RX REV CODE 250 W/ 637 OVERRIDE(OP): Performed by: HOSPITALIST

## 2018-02-10 RX ORDER — REGADENOSON 0.08 MG/ML
INJECTION, SOLUTION INTRAVENOUS
Status: COMPLETED
Start: 2018-02-10 | End: 2018-02-10

## 2018-02-10 RX ADMIN — ONDANSETRON 4 MG: 4 TABLET, ORALLY DISINTEGRATING ORAL at 17:10

## 2018-02-10 RX ADMIN — ASPIRIN 325 MG: 325 TABLET ORAL at 15:38

## 2018-02-10 RX ADMIN — REGADENOSON 0.4 MG: 0.08 INJECTION, SOLUTION INTRAVENOUS at 14:07

## 2018-02-10 RX ADMIN — ENOXAPARIN SODIUM 40 MG: 100 INJECTION SUBCUTANEOUS at 15:39

## 2018-02-10 RX ADMIN — MORPHINE SULFATE 2 MG: 4 INJECTION INTRAVENOUS at 08:23

## 2018-02-10 RX ADMIN — OXYCODONE HYDROCHLORIDE 5 MG: 5 TABLET ORAL at 15:38

## 2018-02-10 ASSESSMENT — COPD QUESTIONNAIRES
DO YOU EVER COUGH UP ANY MUCUS OR PHLEGM?: NO/ONLY WITH OCCASIONAL COLDS OR INFECTIONS
HAVE YOU SMOKED AT LEAST 100 CIGARETTES IN YOUR ENTIRE LIFE: NO/DON'T KNOW
DURING THE PAST 4 WEEKS HOW MUCH DID YOU FEEL SHORT OF BREATH: NONE/LITTLE OF THE TIME
COPD SCREENING SCORE: 2

## 2018-02-10 ASSESSMENT — ENCOUNTER SYMPTOMS
SPUTUM PRODUCTION: 0
HEADACHES: 0
BLURRED VISION: 0
NAUSEA: 0
CHILLS: 0
PALPITATIONS: 0
FEVER: 0
SHORTNESS OF BREATH: 0
CONSTIPATION: 0
DOUBLE VISION: 0
DIARRHEA: 0
ABDOMINAL PAIN: 0
COUGH: 0
VOMITING: 0

## 2018-02-10 ASSESSMENT — LIFESTYLE VARIABLES
EVER_SMOKED: NEVER
EVER_SMOKED: NEVER

## 2018-02-10 ASSESSMENT — PAIN SCALES - GENERAL
PAINLEVEL_OUTOF10: 8
PAINLEVEL_OUTOF10: 9
PAINLEVEL_OUTOF10: 0
PAINLEVEL_OUTOF10: 0

## 2018-02-10 NOTE — CARE PLAN
Problem: Safety  Goal: Will remain free from injury  Outcome: PROGRESSING AS EXPECTED  Pt mobility assessed at beginning of shift. Pt is standby assist. Fall precautions in place. Non-slip socks on. Bed in lowest locked position. Bed alarm on. Call light within reach. Pt educated to call for assistance and verbalizes understanding.    Problem: Knowledge Deficit  Goal: Knowledge of disease process/condition, treatment plan, diagnostic tests, and medications will improve  Outcome: PROGRESSING AS EXPECTED  Pt educated about disease process. Reason why medications are taken. And informed about treatment plan.

## 2018-02-10 NOTE — ASSESSMENT & PLAN NOTE
Atypical, trend troponin, nuclear stress imaging studied, pharmacologic ordered if troponin remains negative.

## 2018-02-10 NOTE — CARE PLAN
Problem: Safety  Goal: Will remain free from injury  Educated pt on safety precautions and pt has verbalized understanding. Pt has demonstrated proper use of the call light and calls appropriately. Pt is wearing non slip socks, in bed in the low locked position and the call light is within reach    Problem: Mobility  Goal: Risk for activity intolerance will decrease  Skin and mobility assessment complete. Pillows in place for positioning.

## 2018-02-10 NOTE — PROGRESS NOTES
Nursing care plan includes knowledge deficit, potential for discomfort, potential for compromised cardiac output. POC includes teaching, comfort measures and reassurance, and access to code cart, cardiology stand by and availability of rapid response team. Pt verbalizes good understanding of benefits and risks of pharmacological cardiac stress test. Informed consent obtained. Lexiscan given, pt developed the following symptoms SOB and nausea. VS stable, major symptoms resolved. To waiting room, caffeinated fluids and/or snacks given. Nursing goals met.

## 2018-02-10 NOTE — PROGRESS NOTES
Pt resting in bed at this time. Even visible chest rise. No signs of distress. Bed alarm on. Call light in place. Bed in low and locked position. VSS. Pt states chest pain is still 0/10.

## 2018-02-10 NOTE — PROGRESS NOTES
Patient is a direct admit from [facility], patient of Dr. Callahan's for chest pain. Dr. Robert is accepting the patient.  Telephone ADT order received and entered into epic on 2/9 at 1747.  Held orders in epic to be released upon patients arrival to unit.

## 2018-02-10 NOTE — PROGRESS NOTES
2118: Dr. Nam paged.  2145: Dr. Ngo paged back and was informed about pt's BP of 197/98 and her 10/10 headache.  Per Dr. Nam a height was needed prior to putting in orders.  Height put in and Md aware. Awaiting orders.

## 2018-02-10 NOTE — H&P
Hospital Medicine History and Physical    Date of Service  2/9/2018    Chief Complaint  Chest pain     History of Presenting Illness  62 y.o. female with history of polio, essential hypertension, was in usual state of health until approximately 2 days prior to admission. She reports at that time, she began to have chest pain, which is left-sided, nonradiating, not associated with exertion and relieved by rest. She presented to an outside facility for further evaluation, and was transferred to this facility for higher level of care. She currently denies any headache or vision changes, she continues to have joint pains especially in her shoulders, she has no abdominal pain, nausea, diarrhea or constipation. Her chest pain is resolved. No fever or chills.   Primary Care Physician  Wilian Hernandez M.D.    Consultants  none    Code Status  Full code     Review of Systems  Review of Systems   Constitutional: Negative for chills and fever.   Eyes: Negative for blurred vision and double vision.   Respiratory: Negative for cough, sputum production and shortness of breath.    Cardiovascular: Positive for chest pain. Negative for palpitations.   Gastrointestinal: Negative for abdominal pain, constipation, diarrhea, nausea and vomiting.   Genitourinary: Negative for dysuria.   Musculoskeletal: Positive for joint pain.   Neurological: Negative for headaches.        Past Medical History  Osteoarthritis  polio    Surgical History  No past surgical history on file.    Medications  No current facility-administered medications on file prior to encounter.      Current Outpatient Prescriptions on File Prior to Encounter   Medication Sig Dispense Refill   • DM-Doxylamine-Acetaminophen (NYQUIL COLD & FLU PO) Take  by mouth.     • Pseudoephedrine HCl (SUDAFED PO) Take  by mouth.     • Phenol (CHLORASEPTIC MOUTH PAIN MT) [place]  in mouth/throat.     • fluticasone (FLONASE) 50 MCG/ACT nasal spray Spray 2 Sprays in nose every day. 1 Bottle  1   • Multiple Vitamin (MULTI-VITAMIN DAILY PO) Take  by mouth.     • ascorbic acid (ASCORBIC ACID) 500 MG Tab Take 500 mg by mouth every day.     • vitamin D, Ergocalciferol, (DRISDOL) 65225 UNITS Cap capsule Take  by mouth every 7 days.     • cyanocobalamin (VITAMIN B-12) 100 MCG Tab Take 100 mcg by mouth every day.     • MAGNESIUM PO Take  by mouth.     • fexofenadine (ALLEGRA) 180 MG tablet Take 1 Tab by mouth every day. 30 Tab 0       Family History  Family history reviewed and non-contributory    Social History  Social History   Substance Use Topics   • Smoking status: Never Smoker   • Smokeless tobacco: Never Used   • Alcohol use No       Allergies  No Known Allergies     Physical Exam  Laboratory   Hemodynamics  Temp (24hrs), Av.8 °C (98.2 °F), Min:36.8 °C (98.2 °F), Max:36.8 °C (98.2 °F)   Temperature: 36.8 °C (98.2 °F)  Pulse  Av  Min: 76  Max: 76    Blood Pressure: (!) 165/90      Respiratory      Respiration: 18, Pulse Oximetry: 94 %        RUL Breath Sounds: Clear, RML Breath Sounds: Clear, RLL Breath Sounds: Diminished, ANUJA Breath Sounds: Clear, LLL Breath Sounds: Diminished    Physical Exam   Constitutional: She is oriented to person, place, and time. She appears well-developed and well-nourished. No distress.   HENT:   Head: Normocephalic and atraumatic.   Eyes: Pupils are equal, round, and reactive to light.   Neck: Normal range of motion. Neck supple.   Cardiovascular: Normal rate, regular rhythm and normal heart sounds.  Exam reveals no gallop and no friction rub.    No murmur heard.  Pulmonary/Chest: Effort normal and breath sounds normal. No respiratory distress. She has no wheezes. She has no rales.   Abdominal: Soft. Bowel sounds are normal. She exhibits no distension. There is no tenderness. There is no rebound.   Musculoskeletal: Normal range of motion. She exhibits no edema.   Neurological: She is alert and oriented to person, place, and time. No cranial nerve deficit.   Skin: Skin  is warm and dry. She is not diaphoretic.   Nursing note and vitals reviewed.        Labs from outside facility within normal limits CBC with diff, BMP.  Troponin was negative      No results for input(s): ALTSGPT, ASTSGOT, ALKPHOSPHAT, TBILIRUBIN, DBILIRUBIN, GAMMAGT, AMYLASE, LIPASE, ALB, PREALBUMIN, GLUCOSE in the last 72 hours.              No results found for: TROPONINI  Urinalysis:  No results found for: SPECGRAVITY, GLUCOSEUR, KETONES, NITRITE, WBCURINE, RBCURINE, BACTERIA, EPITHELCELL     Imaging  Normal chest radiograph from outside facility    Assessment/Plan     I anticipate this patient is appropriate for observation status at this time.    * Chest pain   Assessment & Plan    Atypical, trend troponin, nuclear stress imaging studied, pharmacologic ordered if troponin remains negative.         Essential hypertension   Assessment & Plan    Currently variable controlled.  Continue as needed medications.         Polio   Assessment & Plan    Stable        Osteoarthritis   Assessment & Plan    Various joints, with ongoing pain.  Continue pain management as needed.             VTE prophylaxis: SCD, Lovenox.

## 2018-02-10 NOTE — PROGRESS NOTES
Received patient from night shift nurse. Assessment complete. A&Ox4. C/o entire HA 9/10. Medicated per MAR. Call light within reach. All needs attended to at this time. Bed in low position, treaded slippers on feet.

## 2018-02-11 NOTE — PROGRESS NOTES
Pt is A&O x4. Spouse at bedside. Discharge instructions reviewed with pt and family. Telebox removed and returned to monitor room. IV removed. Pt's belongings accounted for per pt.

## 2018-02-11 NOTE — DISCHARGE SUMMARY
CHIEF COMPLAINT ON ADMISSION  No chief complaint on file.      CODE STATUS  Prior    HPI & HOSPITAL COURSE  This is a 62 y.o. female with past medical history of polio presented with chest pain and dizziness. Her troponins were negative and she underwent myocardial perfusion imaging study this was negative for any evidence of ischemia. She had some intermittent dizziness but had been nothing by mouth all day, she's had no nausea or vomiting and tolerated diet post stress test    Patient was on observation status    Therefore, she is discharged in good and stable condition with close outpatient follow-up.    SPECIFIC OUTPATIENT FOLLOW-UP  Primary care    DISCHARGE PROBLEM LIST  Principal Problem:    Chest pain POA: Unknown  Active Problems:    Osteoarthritis POA: Unknown    Polio POA: Unknown    Essential hypertension POA: Unknown  Resolved Problems:    * No resolved hospital problems. *      FOLLOW UP  No future appointments.  Wilian Hernandez M.D.  [address] [facility] [address]  [phone]    Schedule an appointment as soon as possible for a visit in 1 week        MEDICATIONS ON DISCHARGE   [facility]   Home Medication Instructions HERI:[id number]    Printed on:02/11/18 0937   Medication Information                      ascorbic acid (ASCORBIC ACID) 500 MG Tab  Take 500 mg by mouth every day.             cyanocobalamin (VITAMIN B-12) 100 MCG Tab  Take 100 mcg by mouth every day.             fexofenadine (ALLEGRA) 180 MG tablet  Take 1 Tab by mouth every day.             MAGNESIUM PO  Take  by mouth.             vitamin D, Ergocalciferol, (DRISDOL) 30917 UNITS Cap capsule  Take  by mouth every 7 days.                 DIET  Regular    ACTIVITY  As tolerated      CONSULTATIONS  None    PROCEDURES  None    LABORATORY  Lab Results   Component Value Date/Time    SODIUM 139 02/10/2018 02:19 AM    POTASSIUM 3.6 02/10/2018 02:19 AM    CHLORIDE 109 02/10/2018 02:19 AM    CO2 22 02/10/2018 02:19 AM    GLUCOSE 93  02/10/2018 02:19 AM    BUN 10 02/10/2018 02:19 AM    CREATININE 0.36 (L) 02/10/2018 02:19 AM        Lab Results   Component Value Date/Time    WBC 7.0 02/10/2018 02:19 AM    HEMOGLOBIN 13.2 02/10/2018 02:19 AM    HEMATOCRIT 39.5 02/10/2018 02:19 AM    PLATELETCT 200 02/10/2018 02:19 AM        Total time of the discharge process exceeds 31 minutes

## 2018-02-11 NOTE — DISCHARGE INSTRUCTIONS
Discharge Instructions    Discharged to home by car with relative. Discharged via wheelchair, hospital escort: Yes.  Special equipment needed: Not Applicable and Wheelchair (Her own)    Be sure to schedule a follow-up appointment with your primary care doctor or any specialists as instructed.     Discharge Plan:   Diet Plan: Discussed  Activity Level: Discussed  Confirmed Follow up Appointment: Patient to Call and Schedule Appointment  Confirmed Symptoms Management: Discussed  Medication Reconciliation Updated: Yes  Influenza Vaccine Indication: Not indicated: Previously immunized this influenza season and > 8 years of age    I understand that a diet low in cholesterol, fat, and sodium is recommended for good health. Unless I have been given specific instructions below for another diet, I accept this instruction as my diet prescription.   Other diet: Heart Healthy    Special Instructions: None    · Is patient discharged on Warfarin / Coumadin?   No     Depression / Suicide Risk    As you are discharged from this [facility] facility, it is important to learn how to keep safe from harming yourself.    Recognize the warning signs:  · Abrupt changes in personality, positive or negative- including increase in energy   · Giving away possessions  · Change in eating patterns- significant weight changes-  positive or negative  · Change in sleeping patterns- unable to sleep or sleeping all the time   · Unwillingness or inability to communicate  · Depression  · Unusual sadness, discouragement and loneliness  · Talk of wanting to die  · Neglect of personal appearance   · Rebelliousness- reckless behavior  · Withdrawal from people/activities they love  · Confusion- inability to concentrate     If you or a loved one observes any of these behaviors or has concerns about self-harm, here's what you can do:  · Talk about it- your feelings and reasons for harming yourself  · Remove any means that you might use to hurt yourself  (examples: pills, rope, extension cords, firearm)  · Get professional help from the community (Mental Health, Substance Abuse, psychological counseling)  · Do not be alone:Call your Safe Contact- someone whom you trust who will be there for you.  · Call your local CRISIS HOTLINE 043-7174 or 796-294-6976  · Call your local Children's Mobile Crisis Response Team [place] (875) 271-8497 or www.News360  · Call the toll free National Suicide Prevention Hotlines   · National Suicide Prevention Lifeline 258-024-FYQY (4926)  · National Hope Line Network 800-SUICIDE (397-0390)

## 2021-07-13 ENCOUNTER — APPOINTMENT (RX ONLY)
Dept: URBAN - NONMETROPOLITAN AREA CLINIC 15 | Facility: CLINIC | Age: 66
Setting detail: DERMATOLOGY
End: 2021-07-13

## 2021-07-13 DIAGNOSIS — R21 RASH AND OTHER NONSPECIFIC SKIN ERUPTION: ICD-10-CM

## 2021-07-13 DIAGNOSIS — L29.89 OTHER PRURITUS: ICD-10-CM

## 2021-07-13 DIAGNOSIS — B36.0 PITYRIASIS VERSICOLOR: ICD-10-CM

## 2021-07-13 DIAGNOSIS — L65.9 NONSCARRING HAIR LOSS, UNSPECIFIED: ICD-10-CM

## 2021-07-13 DIAGNOSIS — L259 CONTACT DERMATITIS AND OTHER ECZEMA, UNSPECIFIED CAUSE: ICD-10-CM

## 2021-07-13 DIAGNOSIS — L29.8 OTHER PRURITUS: ICD-10-CM

## 2021-07-13 PROBLEM — L23.9 ALLERGIC CONTACT DERMATITIS, UNSPECIFIED CAUSE: Status: ACTIVE | Noted: 2021-07-13

## 2021-07-13 PROCEDURE — ? BIOPSY BY PUNCH METHOD

## 2021-07-13 PROCEDURE — 11104 PUNCH BX SKIN SINGLE LESION: CPT

## 2021-07-13 PROCEDURE — ? PRESCRIPTION

## 2021-07-13 PROCEDURE — 99203 OFFICE O/P NEW LOW 30 MIN: CPT | Mod: 25

## 2021-07-13 PROCEDURE — ? COUNSELING

## 2021-07-13 RX ORDER — CLOTRIMAZOLE AND BETAMETHASONE DIPROPIONATE 10; .5 MG/G; MG/G
1 CREAM TOPICAL BID
Qty: 1 | Refills: 2 | Status: ERX | COMMUNITY
Start: 2021-07-13

## 2021-07-13 RX ORDER — CLOBETASOL PROPIONATE 0.46 MG/ML
1 SOLUTION TOPICAL BID
Qty: 1 | Refills: 3 | Status: ERX | COMMUNITY
Start: 2021-07-13

## 2021-07-13 RX ADMIN — CLOBETASOL PROPIONATE 1: 0.46 SOLUTION TOPICAL at 00:00

## 2021-07-13 RX ADMIN — CLOTRIMAZOLE AND BETAMETHASONE DIPROPIONATE 1: 10; .5 CREAM TOPICAL at 00:00

## 2021-07-13 ASSESSMENT — LOCATION SIMPLE DESCRIPTION DERM
LOCATION SIMPLE: LEFT FOREHEAD
LOCATION SIMPLE: POSTERIOR SCALP
LOCATION SIMPLE: ABDOMEN

## 2021-07-13 ASSESSMENT — LOCATION DETAILED DESCRIPTION DERM
LOCATION DETAILED: LEFT SUPERIOR FOREHEAD
LOCATION DETAILED: EPIGASTRIC SKIN
LOCATION DETAILED: LEFT RIB CAGE
LOCATION DETAILED: RIGHT LATERAL ABDOMEN
LOCATION DETAILED: POSTERIOR MID-PARIETAL SCALP
LOCATION DETAILED: LEFT SUPERIOR LATERAL FOREHEAD
LOCATION DETAILED: RIGHT RIB CAGE

## 2021-07-13 ASSESSMENT — LOCATION ZONE DERM
LOCATION ZONE: SCALP
LOCATION ZONE: FACE
LOCATION ZONE: TRUNK

## 2021-07-13 NOTE — HPI: RASH
What Type Of Note Output Would You Prefer (Optional)?: Standard Output
Is The Patient Presenting As Previously Scheduled?: Yes
How Severe Is Your Rash?: mild
Is This A New Presentation, Or A Follow-Up?: Rash
Additional History: Heat rash

## 2021-07-13 NOTE — PROCEDURE: COUNSELING
Detail Level: Zone
Patient Specific Counseling (Will Not Stick From Patient To Patient): Pt reports rubbing garlic on this area in the past when the rash appears.
Patient Specific Counseling (Will Not Stick From Patient To Patient): Pt reports rubbing a potato on this area.

## 2021-07-13 NOTE — PROCEDURE: BIOPSY BY PUNCH METHOD
Detail Level: Detailed
Was A Bandage Applied: Yes
Punch Size In Mm: 4
Biopsy Type: H and E
Anesthesia Type: 1% lidocaine with epinephrine
Anesthesia Volume In Cc: 0.5
Additional Anesthesia Volume In Cc (Will Not Render If 0): 0
Hemostasis: None
Epidermal Sutures: 4-0 Polysorb
Number Of Epidermal Sutures (Optional): 2
Wound Care: Vaseline
Dressing: bandage
Suture Removal: 10 days
Size Of Lesion In Cm (Optional): 0.3
Lab: [id number]
Lab Facility: 
Render Path Notes In Note?: No
Consent: Written consent was obtained and risks were reviewed including but not limited to scarring, infection, bleeding, scabbing, incomplete removal, nerve damage and allergy to anesthesia.
Post-Care Instructions: I reviewed with the patient in detail post-care instructions. Patient is to keep the biopsy site dry overnight, and then apply bacitracin twice daily until healed. Patient may apply hydrogen peroxide soaks to remove any crusting.
Home Suture Removal Text: Patient was provided a home suture removal kit and will remove their sutures at home.  If they have any questions or difficulties they will call the office.
Notification Instructions: Patient will be notified of biopsy results. However, patient instructed to call the office if not contacted within 2 weeks.
Billing Type: Third-Party Bill
Information: Selecting Yes will display possible errors in your note based on the variables you have selected. This validation is only offered as a suggestion for you. PLEASE NOTE THAT THE VALIDATION TEXT WILL BE REMOVED WHEN YOU FINALIZE YOUR NOTE. IF YOU WANT TO FAX A PRELIMINARY NOTE YOU WILL NEED TO TOGGLE THIS TO 'NO' IF YOU DO NOT WANT IT IN YOUR FAXED NOTE.

## 2021-08-10 ENCOUNTER — APPOINTMENT (RX ONLY)
Dept: URBAN - METROPOLITAN AREA CLINIC 4 | Facility: CLINIC | Age: 66
Setting detail: DERMATOLOGY
End: 2021-08-10

## 2021-08-10 DIAGNOSIS — C85.8 OTHER SPECIFIED TYPES OF NON-HODGKIN LYMPHOMA: ICD-10-CM

## 2021-08-10 PROBLEM — C85.80 OTHER SPECIFIED TYPES OF NON-HODGKIN LYMPHOMA, UNSPECIFIED SITE: Status: ACTIVE | Noted: 2021-08-10

## 2021-08-10 PROCEDURE — ? REFERRAL

## 2021-08-10 PROCEDURE — ? BIOPSY BY PUNCH METHOD

## 2021-08-10 PROCEDURE — 11105 PUNCH BX SKIN EA SEP/ADDL: CPT

## 2021-08-10 PROCEDURE — 11104 PUNCH BX SKIN SINGLE LESION: CPT

## 2021-08-10 PROCEDURE — ? COUNSELING

## 2021-08-10 ASSESSMENT — LOCATION ZONE DERM: LOCATION ZONE: FACE

## 2021-08-10 ASSESSMENT — LOCATION DETAILED DESCRIPTION DERM
LOCATION DETAILED: RIGHT SUPERIOR LATERAL FOREHEAD
LOCATION DETAILED: LEFT SUPERIOR LATERAL FOREHEAD

## 2021-08-10 ASSESSMENT — LOCATION SIMPLE DESCRIPTION DERM
LOCATION SIMPLE: LEFT FOREHEAD
LOCATION SIMPLE: RIGHT FOREHEAD

## 2021-08-25 ENCOUNTER — APPOINTMENT (RX ONLY)
Dept: URBAN - NONMETROPOLITAN AREA CLINIC 15 | Facility: CLINIC | Age: 66
Setting detail: DERMATOLOGY
End: 2021-08-25

## 2021-08-25 DIAGNOSIS — Z48.02 ENCOUNTER FOR REMOVAL OF SUTURES: ICD-10-CM

## 2021-08-25 DIAGNOSIS — C85.8 OTHER SPECIFIED TYPES OF NON-HODGKIN LYMPHOMA: ICD-10-CM

## 2021-08-25 PROBLEM — C85.80 OTHER SPECIFIED TYPES OF NON-HODGKIN LYMPHOMA, UNSPECIFIED SITE: Status: ACTIVE | Noted: 2021-08-25

## 2021-08-25 PROCEDURE — ? SUTURE REMOVAL (GLOBAL PERIOD)

## 2021-08-25 PROCEDURE — ? COUNSELING

## 2021-08-25 PROCEDURE — 99213 OFFICE O/P EST LOW 20 MIN: CPT

## 2021-08-25 PROCEDURE — ? ADDITIONAL NOTES

## 2021-08-25 ASSESSMENT — LOCATION SIMPLE DESCRIPTION DERM
LOCATION SIMPLE: SCALP
LOCATION SIMPLE: LEFT FOREHEAD

## 2021-08-25 ASSESSMENT — LOCATION DETAILED DESCRIPTION DERM
LOCATION DETAILED: RIGHT CENTRAL FRONTAL SCALP
LOCATION DETAILED: LEFT SUPERIOR FOREHEAD

## 2021-08-25 ASSESSMENT — LOCATION ZONE DERM
LOCATION ZONE: FACE
LOCATION ZONE: SCALP

## 2021-08-25 NOTE — PROCEDURE: SUTURE REMOVAL (GLOBAL PERIOD)
Detail Level: Detailed
Add 77157 Cpt? (Important Note: In 2017 The Use Of 96183 Is Being Tracked By Cms To Determine Future Global Period Reimbursement For Global Periods): no

## 2021-08-25 NOTE — PROCEDURE: ADDITIONAL NOTES
Render Risk Assessment In Note?: no
Additional Notes: Richard, pt's  translated at todays visit.  Pt declined full body exam because \"it of being in a wheelchair and her home care nurse looked at her upper body and did not see anything.\"  Pt is reluctant to comprehend the diagnosis and the complications of it.  Pt stated \"that she is healthy, she takes her vitamins, so she does not believe that there is a possibility of having anything wrong with her.\"  I spent 20-30 mins discussing the diagnosis, concerns of this condition spreading to more than her skin and that additional workup would be needed.  Also discussed why we needed to preform additional bx's at her last visit and once we receive those results we can determine the next course of action.  Richard and Lucero were not pleased with the idea of a referral to oncology for further workup.  Discussed that if this is concerning for systemic disease that she needs to be under the care of specialist.  Pt got aggravated and began lifting her shirt and pant legs to express that she did not have anything else on her skin.  From the limited examination obtained, I did not see any other lesion of concern.  At the end of our conversation Richard and Lucero appeared to understand the diagnosis, concerns and  the fact that this might be systemic which would require the referral.
Detail Level: Detailed

## 2023-02-06 PROBLEM — T84.84XA PAINFUL ORTHOPAEDIC HARDWARE (HCC): Status: ACTIVE | Noted: 2023-02-06

## 2023-06-12 PROBLEM — M17.9 OSTEOARTHRITIS, KNEE: Status: ACTIVE | Noted: 2023-06-12
